# Patient Record
Sex: FEMALE | Race: AMERICAN INDIAN OR ALASKA NATIVE
[De-identification: names, ages, dates, MRNs, and addresses within clinical notes are randomized per-mention and may not be internally consistent; named-entity substitution may affect disease eponyms.]

---

## 2017-06-03 ENCOUNTER — HOSPITAL ENCOUNTER (EMERGENCY)
Dept: HOSPITAL 5 - ED | Age: 42
Discharge: HOME | End: 2017-06-03
Payer: COMMERCIAL

## 2017-06-03 VITALS — DIASTOLIC BLOOD PRESSURE: 71 MMHG | SYSTOLIC BLOOD PRESSURE: 166 MMHG

## 2017-06-03 DIAGNOSIS — E11.9: ICD-10-CM

## 2017-06-03 DIAGNOSIS — Z88.6: ICD-10-CM

## 2017-06-03 DIAGNOSIS — J45.21: Primary | ICD-10-CM

## 2017-06-03 DIAGNOSIS — I10: ICD-10-CM

## 2017-06-03 PROCEDURE — 99283 EMERGENCY DEPT VISIT LOW MDM: CPT

## 2017-06-03 PROCEDURE — 94640 AIRWAY INHALATION TREATMENT: CPT
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
## 2018-10-23 ENCOUNTER — HOSPITAL ENCOUNTER (EMERGENCY)
Dept: HOSPITAL 5 - ED | Age: 43
Discharge: HOME | End: 2018-10-23
Payer: COMMERCIAL

## 2018-10-23 VITALS — SYSTOLIC BLOOD PRESSURE: 134 MMHG | DIASTOLIC BLOOD PRESSURE: 80 MMHG
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
## 2018-10-23 DIAGNOSIS — Z88.7: ICD-10-CM

## 2018-10-23 DIAGNOSIS — S39.012A: Primary | ICD-10-CM
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
## 2018-10-23 DIAGNOSIS — Z88.6: ICD-10-CM
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
## 2018-10-23 LAB
BACTERIA #/AREA URNS HPF: (no result) /HPF
BILIRUB UR QL STRIP: (no result)
BLOOD UR QL VISUAL: (no result)
HYALINE CASTS #/AREA URNS LPF: 2 /LPF
MUCOUS THREADS #/AREA URNS HPF: (no result) /HPF
PH UR STRIP: 5 [PH] (ref 5–7)
RBC #/AREA URNS HPF: 2 /HPF (ref 0–6)
UROBILINOGEN UR-MCNC: 2 MG/DL (ref ?–2)
WBC #/AREA URNS HPF: 6 /HPF (ref 0–6)

## 2018-10-23 PROCEDURE — 81001 URINALYSIS AUTO W/SCOPE: CPT

## 2018-10-23 PROCEDURE — 99283 EMERGENCY DEPT VISIT LOW MDM: CPT

## 2018-10-23 NOTE — EMERGENCY DEPARTMENT REPORT
ED Back Pain/Injury HPI





- General


Chief Complaint: Back Pain/Injury


Stated Complaint: BACK PAIN


Time Seen by Provider: 10/23/18 13:52


Source: patient


Limitations: No Limitations





- History of Present Illness


Initial Comments: 





back pain for several days


no injury, pain primarily in R flank


denies abd pain, n/v, urinary complaints


no numbness, weakness, bowel/bladder dysfunction


pain worse with movement


MD Complaint: back pain


-: Gradual, days(s)


Place: home


Radiation: none


Severity: moderate


Severity scale (0 -10): 5


Quality: aching


Consistency: constant


Improves With: immobilization


Worsens With: movement


Associated Symptoms: denies other symptoms





- Related Data


 Previous Rx's











 Medication  Instructions  Recorded  Last Taken  Type


 


ALPRAZolam [Xanax TAB] 0.25 mg PO BID PRN #14 tab 07/11/15 Unknown Rx


 


Losartan/Hydrochlorothiazide 1 each PO QDAY #30 tablet 07/11/15 Unknown Rx





[Hyzaar 100-12.5 TAB]    


 


ALBUTEROL Inhaler (OR & NICU) 2 puff IH QID PRN #1 inhalation 11/21/16 Unknown 

Rx





[ProAir HFA Inhaler]    


 


ALBUTEROL NEB's [Proventil 0.083% 2.5 mg IH TID PRN #1 pump 06/03/17 Unknown Rx





NEBS]    


 


Acetamin/Codeine 120-12Mg/5 ml 5 ml PO TID PRN #50 ml 06/03/17 Unknown Rx





[Tylenol/Codeine]    


 


Albuterol Sulfate [Albuterol 0.63% 0.63 mg IH TID PRN #1 pack 06/03/17 Unknown 

Rx





NEBS]    


 


Benzonatate [Tessalon Perles] 100 mg PO Q8HR #21 capsule 06/03/17 Unknown Rx


 


predniSONE [Deltasone] 20 mg PO QDAY #5 tab 06/03/17 Unknown Rx


 


Lidocaine [Lidoderm] 1 each TP DAILY #10 adh..patch 10/23/18 Unknown Rx


 


tiZANidine [Zanaflex] 4 mg PO TID PRN #15 tablet 10/23/18 Unknown Rx











 Allergies











Allergy/AdvReac Type Severity Reaction Status Date / Time


 


aspirin AdvReac  Unknown Verified 10/03/16 18:26


 


NSAIDS (Non-Steroidal AdvReac  Unknown Verified 10/03/16 18:26





Anti-Inflamma     














ED Review of Systems


ROS: 


Stated complaint: BACK PAIN


Other details as noted in HPI





Comment: All other systems reviewed and negative


Genitourinary: denies: urgency, dysuria





ED Past Medical Hx


Family history: no significant family history





ED Back Pain Physical Exam





- Exam


General: 


Vital signs noted. No distress. Alert and acting appropriately.





Back/Abdomen: Yes Flank Tenderness (R, pain with movement), No Abdominal 

Tenderness, No Perithoracic Tenderness, No Perilumbar Tenderness, No Sacroiliac 

Tenderness, No Straight Leg Raise Pain


Neuro: Yes Normal Sensation, Yes Normal DTR's, Yes Normal Gait, No Motor 

Weakness





ED Medical Decision Making





- Medical Decision Making





flank pain


worse with movement


UA without blood or infection


likely muscular


will treat and refer for f/u





- Differential Diagnosis


strain/spasms, uti, stone 


Critical care attestation.: 


If time is entered above; I have spent that time in minutes in the direct care 

of this critically ill patient, excluding procedure time.








ED Disposition


Clinical Impression: 


Back strain


Qualifiers:


 Encounter type: initial encounter Qualified Code(s): S39.012A - Strain of 

muscle, fascia and tendon of lower back, initial encounter





Disposition: DC-01 TO HOME OR SELFCARE


Is pt being admited?: No


Condition: Good


Instructions:  Muscle Strain (ED)


Prescriptions: 


Lidocaine [Lidoderm] 1 each TP DAILY #10 adh..patch


tiZANidine [Zanaflex] 4 mg PO TID PRN #15 tablet


 PRN Reason: Spasms


Referrals: 


LORETO REAGAN MD [Staff Physician] - 3-5 Days


Time of Disposition: 14:19

## 2019-01-10 ENCOUNTER — HOSPITAL ENCOUNTER (OUTPATIENT)
Dept: HOSPITAL 5 - LAB | Age: 44
Discharge: HOME | End: 2019-01-10
Attending: NURSE PRACTITIONER
Payer: COMMERCIAL

## 2019-01-10 DIAGNOSIS — I10: Primary | ICD-10-CM

## 2019-01-10 DIAGNOSIS — E11.9: ICD-10-CM

## 2019-01-10 DIAGNOSIS — E55.9: ICD-10-CM

## 2019-01-10 DIAGNOSIS — Z90.710: ICD-10-CM

## 2019-01-10 DIAGNOSIS — Z88.6: ICD-10-CM

## 2019-01-10 LAB
ALBUMIN SERPL-MCNC: 4.2 G/DL (ref 3.9–5)
ALT SERPL-CCNC: 10 UNITS/L (ref 7–56)
BUN SERPL-MCNC: 13 MG/DL (ref 7–17)
BUN/CREAT SERPL: 19 %
CALCIUM SERPL-MCNC: 9.8 MG/DL (ref 8.4–10.2)
HEMOLYSIS INDEX: 14

## 2019-01-10 PROCEDURE — 83036 HEMOGLOBIN GLYCOSYLATED A1C: CPT

## 2019-01-10 PROCEDURE — 36415 COLL VENOUS BLD VENIPUNCTURE: CPT

## 2019-01-10 PROCEDURE — 82306 VITAMIN D 25 HYDROXY: CPT

## 2019-01-10 PROCEDURE — 80053 COMPREHEN METABOLIC PANEL: CPT

## 2019-01-17 LAB — VITAMIN D2 SERPL-MCNC: (no result) PG/ML

## 2019-06-07 ENCOUNTER — HOSPITAL ENCOUNTER (OUTPATIENT)
Dept: HOSPITAL 5 - MAMMO | Age: 44
Discharge: HOME | End: 2019-06-07
Attending: ADVANCED PRACTICE MIDWIFE
Payer: COMMERCIAL

## 2019-06-07 DIAGNOSIS — Z90.710: ICD-10-CM

## 2019-06-07 DIAGNOSIS — Z12.31: Primary | ICD-10-CM

## 2019-06-07 PROCEDURE — 77067 SCR MAMMO BI INCL CAD: CPT
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
## 2019-11-29 ENCOUNTER — HOSPITAL ENCOUNTER (EMERGENCY)
Dept: HOSPITAL 5 - ED | Age: 44
Discharge: HOME | End: 2019-11-29
Payer: SELF-PAY

## 2019-11-29 VITALS — DIASTOLIC BLOOD PRESSURE: 100 MMHG | SYSTOLIC BLOOD PRESSURE: 170 MMHG

## 2019-11-29 DIAGNOSIS — J45.909: ICD-10-CM

## 2019-11-29 DIAGNOSIS — J45.901: Primary | ICD-10-CM

## 2019-11-29 DIAGNOSIS — I10: ICD-10-CM

## 2019-11-29 DIAGNOSIS — E11.9: ICD-10-CM

## 2019-11-29 PROCEDURE — 99283 EMERGENCY DEPT VISIT LOW MDM: CPT

## 2019-11-29 PROCEDURE — 71046 X-RAY EXAM CHEST 2 VIEWS: CPT

## 2019-11-29 NOTE — XRAY REPORT
CHEST 2 VIEWS 



INDICATION / CLINICAL INFORMATION:

SOB.



COMPARISON: 

11/21/2016



FINDINGS:



SUPPORT DEVICES: None.



HEART / MEDIASTINUM: No significant abnormality. 



LUNGS / PLEURA: There is mild chronic appearing diffuse interstitial prominence without discrete cons
olidation or pleural effusion. No pneumothorax. 



ADDITIONAL FINDINGS: No significant additional findings.



IMPRESSION:

1. Chronic appearing diffuse reticular interstitial prominence most likely reflects chronic interstit
ial lung disease. This has worsened since 11/21/2016. There is no focal consolidation or pleural effu
tanner.



Signer Name: Johnny Gatica MD 

Signed: 11/29/2019 2:34 PM

 Workstation Name: [id number]
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
## 2020-02-20 ENCOUNTER — HOSPITAL ENCOUNTER (OUTPATIENT)
Dept: HOSPITAL 5 - LAB | Age: 45
Discharge: HOME | End: 2020-02-20
Attending: PHYSICIAN ASSISTANT
Payer: COMMERCIAL

## 2020-02-20 DIAGNOSIS — I10: ICD-10-CM

## 2020-02-20 DIAGNOSIS — E11.9: ICD-10-CM

## 2020-02-20 DIAGNOSIS — Z13.29: ICD-10-CM

## 2020-02-20 DIAGNOSIS — Z00.00: Primary | ICD-10-CM

## 2020-02-20 LAB
ALBUMIN SERPL-MCNC: 4.1 G/DL (ref 3.9–5)
ALT SERPL-CCNC: 10 UNITS/L (ref 7–56)
BUN SERPL-MCNC: 12 MG/DL (ref 7–17)
BUN/CREAT SERPL: 17 %
CALCIUM SERPL-MCNC: 9.4 MG/DL (ref 8.4–10.2)
HCT VFR BLD CALC: 36.6 % (ref 30.3–42.9)
HDLC SERPL-MCNC: 44 MG/DL (ref 40–59)
HEMOLYSIS INDEX: 3
HGB BLD-MCNC: 12.3 GM/DL (ref 10.1–14.3)
MCHC RBC AUTO-ENTMCNC: 34 % (ref 30–34)
MCV RBC AUTO: 85 FL (ref 79–97)
PLATELET # BLD: 337 K/MM3 (ref 140–440)
RBC # BLD AUTO: 4.28 M/MM3 (ref 3.65–5.03)

## 2020-02-20 PROCEDURE — 87591 N.GONORRHOEAE DNA AMP PROB: CPT

## 2020-02-20 PROCEDURE — 86592 SYPHILIS TEST NON-TREP QUAL: CPT

## 2020-02-20 PROCEDURE — 80061 LIPID PANEL: CPT

## 2020-02-20 PROCEDURE — 85027 COMPLETE CBC AUTOMATED: CPT

## 2020-02-20 PROCEDURE — 80053 COMPREHEN METABOLIC PANEL: CPT

## 2020-02-20 PROCEDURE — 84443 ASSAY THYROID STIM HORMONE: CPT

## 2020-02-20 PROCEDURE — 82306 VITAMIN D 25 HYDROXY: CPT

## 2020-02-20 PROCEDURE — 83036 HEMOGLOBIN GLYCOSYLATED A1C: CPT

## 2020-02-20 PROCEDURE — 86689 HTLV/HIV CONFIRMJ ANTIBODY: CPT

## 2020-02-20 PROCEDURE — 36415 COLL VENOUS BLD VENIPUNCTURE: CPT

## 2020-02-20 PROCEDURE — 84439 ASSAY OF FREE THYROXINE: CPT

## 2020-02-24 LAB — VITAMIN D2 SERPL-MCNC: <4 NG/ML

## 2020-03-23 ENCOUNTER — HOSPITAL ENCOUNTER (EMERGENCY)
Dept: HOSPITAL 5 - ED | Age: 45
LOS: 1 days | Discharge: LEFT BEFORE DISCHARGE | End: 2020-03-24
Payer: COMMERCIAL

## 2020-03-23 DIAGNOSIS — Z79.899: ICD-10-CM

## 2020-03-23 DIAGNOSIS — E11.9: ICD-10-CM

## 2020-03-23 DIAGNOSIS — Z90.710: ICD-10-CM

## 2020-03-23 DIAGNOSIS — I11.0: Primary | ICD-10-CM

## 2020-03-23 DIAGNOSIS — J45.909: ICD-10-CM

## 2020-03-23 DIAGNOSIS — I50.9: ICD-10-CM

## 2020-03-23 DIAGNOSIS — Z88.6: ICD-10-CM

## 2020-03-23 DIAGNOSIS — Z98.890: ICD-10-CM

## 2020-03-23 PROCEDURE — 83880 ASSAY OF NATRIURETIC PEPTIDE: CPT

## 2020-03-23 PROCEDURE — 99284 EMERGENCY DEPT VISIT MOD MDM: CPT

## 2020-03-23 PROCEDURE — 85025 COMPLETE CBC W/AUTO DIFF WBC: CPT

## 2020-03-23 PROCEDURE — 93010 ELECTROCARDIOGRAM REPORT: CPT

## 2020-03-23 PROCEDURE — 96374 THER/PROPH/DIAG INJ IV PUSH: CPT

## 2020-03-23 PROCEDURE — 80053 COMPREHEN METABOLIC PANEL: CPT

## 2020-03-23 PROCEDURE — 94640 AIRWAY INHALATION TREATMENT: CPT

## 2020-03-23 PROCEDURE — 96375 TX/PRO/DX INJ NEW DRUG ADDON: CPT

## 2020-03-23 PROCEDURE — 84484 ASSAY OF TROPONIN QUANT: CPT

## 2020-03-23 PROCEDURE — 93005 ELECTROCARDIOGRAM TRACING: CPT

## 2020-03-23 PROCEDURE — 71046 X-RAY EXAM CHEST 2 VIEWS: CPT

## 2020-03-23 PROCEDURE — 36415 COLL VENOUS BLD VENIPUNCTURE: CPT

## 2020-03-23 NOTE — EMERGENCY DEPARTMENT REPORT
Blank Doc





- Documentation


Documentation: 





44-year-old female that presents with cough and SOB.





This initial assessment/diagnostic orders/clinical plan/treatment(s) is/are 

subject to change based on patient's health status, clinical progression and re-

assessment by fellow clinical providers in the ED.  Further treatment and workup

at subsequent clinical providers discretion.  Patient/guardians urged not to 

elope from the ED as their condition may be serious if not clinically assessed 

and managed.  Initial orders include:


1- Patient sent to [facility] for further evaluation and treatment


2- CXR

## 2020-03-23 NOTE — XRAY REPORT
CHEST 2 VIEWS 



INDICATION / CLINICAL INFORMATION:

cough.



COMPARISON: 

Chest x-ray 11/29/2019



FINDINGS:



SUPPORT DEVICES: None.

HEART / MEDIASTINUM: Borderline enlarged with pulmonary venous hypertension 

LUNGS / PLEURA: Mild increased interstitial markings again noted. No pneumothorax. 



ADDITIONAL FINDINGS: No significant additional findings.



IMPRESSION:

1. Resolving interstitial disease



Signer Name: Karl Johnson MD 

Signed: 3/23/2020 10:16 PM

Workstation Name: [id number]

## 2020-03-24 VITALS — DIASTOLIC BLOOD PRESSURE: 99 MMHG | SYSTOLIC BLOOD PRESSURE: 142 MMHG

## 2020-03-24 LAB
ALBUMIN SERPL-MCNC: 4.1 G/DL (ref 3.9–5)
ALT SERPL-CCNC: 48 UNITS/L (ref 7–56)
BASOPHILS # (AUTO): 0 K/MM3 (ref 0–0.1)
BASOPHILS NFR BLD AUTO: 0.7 % (ref 0–1.8)
BUN SERPL-MCNC: 14 MG/DL (ref 7–17)
BUN/CREAT SERPL: 18 %
CALCIUM SERPL-MCNC: 9.4 MG/DL (ref 8.4–10.2)
EOSINOPHIL # BLD AUTO: 0.1 K/MM3 (ref 0–0.4)
EOSINOPHIL NFR BLD AUTO: 1.6 % (ref 0–4.3)
HCT VFR BLD CALC: 35 % (ref 30.3–42.9)
HEMOLYSIS INDEX: 7
HGB BLD-MCNC: 11.2 GM/DL (ref 10.1–14.3)
LYMPHOCYTES # BLD AUTO: 1.6 K/MM3 (ref 1.2–5.4)
LYMPHOCYTES NFR BLD AUTO: 26.1 % (ref 13.4–35)
MCHC RBC AUTO-ENTMCNC: 32 % (ref 30–34)
MCV RBC AUTO: 88 FL (ref 79–97)
MONOCYTES # (AUTO): 0.3 K/MM3 (ref 0–0.8)
MONOCYTES % (AUTO): 4.4 % (ref 0–7.3)
PLATELET # BLD: 278 K/MM3 (ref 140–440)
RBC # BLD AUTO: 3.97 M/MM3 (ref 3.65–5.03)

## 2020-03-24 NOTE — EMERGENCY DEPARTMENT REPORT
ED General Adult HPI





- General


Chief complaint: Dyspnea/Respdistress


Stated complaint: SOB/ASTHMA


Time Seen by Provider: 03/23/20 21:17


Source: patient


Mode of arrival: Ambulatory


Limitations: No Limitations





- History of Present Illness


Initial comments: 


Ms. Sandhu is a 43 y/o aaf with hx of Asthma, htn, CHF, and  dmii, pt on lasix for

edema control, no dx of chf to chart. Pt presents today for sob, dizziness, with

wheezing and cough, that is productive clear. There is no fever , no chills , no

n/v. pt rates symptoms as 4/10 , not improving with with albuterol inhaler. 

Symptoms are exacerbated by activity. 


Onset/Timing: 3


-: days(s)


Location: chest


Severity scale (0 -10): 5


Quality: aching


Consistency: constant


Improves with: none


Worsens with: other (environmental exposure )


Associated Symptoms: chest pain, cough, shortness of breath





- Related Data


                                  Previous Rx's











 Medication  Instructions  Recorded  Last Taken  Type


 


ALPRAZolam [Xanax TAB] 0.25 mg PO BID PRN #14 tab 07/11/15 Unknown Rx


 


Losartan/Hydrochlorothiazide 1 each PO QDAY #30 tablet 07/11/15 Unknown Rx





[Hyzaar 100-12.5 TAB]    


 


Albuterol INH(or & Nicu Only) 2 puff IH QID PRN #1 inhalation 11/21/16 Unknown 

Rx





[ProAir HFA Inhaler]    


 


ALBUTEROL NEB's [Proventil 0.083% 2.5 mg IH TID PRN #1 pump 06/03/17 Unknown Rx





NEBS]    


 


Acetamin/Codeine 120-12Mg/5 ml 5 ml PO TID PRN #50 ml 06/03/17 Unknown Rx





[Tylenol/Codeine]    


 


Albuterol Sulfate [Albuterol 0.63% 0.63 mg IH TID PRN #1 pack 06/03/17 Unknown 

Rx





NEBS]    


 


Benzonatate [Tessalon Perles] 100 mg PO Q8HR #21 capsule 06/03/17 Unknown Rx


 


predniSONE [Deltasone] 20 mg PO QDAY #5 tab 06/03/17 Unknown Rx


 


Lidocaine [Lidoderm] 1 each TP DAILY #10 adh..patch 10/23/18 Unknown Rx


 


tiZANidine [Zanaflex 4mg TAB] 4 mg PO TID PRN #15 tablet 10/23/18 Unknown Rx


 


Albuterol Sulfate [Proventil Hfa] 2 puff IH Q4HR PRN #1 hfa.aer.ad 11/29/19 

Unknown Rx


 


Benzonatate [Tessalon Perles] 100 mg PO Q8HR PRN #20 capsule 11/29/19 Unknown Rx


 


predniSONE [Deltasone] 50 mg PO QDAY #5 tab 11/29/19 Unknown Rx











                                    Allergies











Allergy/AdvReac Type Severity Reaction Status Date / Time


 


aspirin AdvReac  Unknown Verified 10/03/16 18:26


 


NSAIDS (Non-Steroidal AdvReac  Unknown Verified 10/03/16 18:26





Anti-Inflamma     














ED Review of Systems


ROS: 


Stated complaint: SOB/ASTHMA


Other details as noted in HPI





Constitutional: denies: chills, fever


Eyes: denies: eye pain, eye discharge, vision change


ENT: congestion


Respiratory: cough, shortness of breath, wheezing


Cardiovascular: chest pain


Endocrine: no symptoms reported


Gastrointestinal: denies: nausea, vomiting


Genitourinary: denies: urgency, dysuria, discharge


Musculoskeletal: denies: back pain, joint swelling, arthralgia


Skin: denies: rash, lesions


Neurological: denies: headache, weakness, paresthesias, vertigo


Psychiatric: denies: anxiety, depression


Hematological/Lymphatic: denies: easy bleeding, easy bruising





ED Past Medical Hx





- Past Medical History


Hx Hypertension: Yes


Hx Congestive Heart Failure: No


Hx Diabetes: Yes


Hx Asthma: Yes


Hx COPD: No





- Surgical History


Additional Surgical History: Multiple skin grafts, hysterectomy





- Social History


Smoking Status: Never Smoker


Substance Use Type: None





- Medications


Home Medications: 


                                Home Medications











 Medication  Instructions  Recorded  Confirmed  Last Taken  Type


 


ALPRAZolam [Xanax TAB] 0.25 mg PO BID PRN #14 tab 07/11/15 11/21/16 Unknown Rx


 


Losartan/Hydrochlorothiazide 1 each PO QDAY #30 tablet 07/11/15 11/21/16 Unknown

Rx





[Hyzaar 100-12.5 TAB]     


 


Albuterol INH(or & Nicu Only) 2 puff IH QID PRN #1 inhalation 11/21/16  Unknown 

Rx





[ProAir HFA Inhaler]     


 


ALBUTEROL NEB's [Proventil 0.083% 2.5 mg IH TID PRN #1 pump 06/03/17  Unknown Rx





NEBS]     


 


Acetamin/Codeine 120-12Mg/5 ml 5 ml PO TID PRN #50 ml 06/03/17  Unknown Rx





[Tylenol/Codeine]     


 


Albuterol Sulfate [Albuterol 0.63% 0.63 mg IH TID PRN #1 pack 06/03/17  Unknown 

Rx





NEBS]     


 


Benzonatate [Tessalon Perles] 100 mg PO Q8HR #21 capsule 06/03/17  Unknown Rx


 


predniSONE [Deltasone] 20 mg PO QDAY #5 tab 06/03/17  Unknown Rx


 


Lidocaine [Lidoderm] 1 each TP DAILY #10 adh..patch 10/23/18  Unknown Rx


 


tiZANidine [Zanaflex 4mg TAB] 4 mg PO TID PRN #15 tablet 10/23/18  Unknown Rx


 


Albuterol Sulfate [Proventil Hfa] 2 puff IH Q4HR PRN #1 hfa.aer.ad 11/29/19  

Unknown Rx


 


Benzonatate [Tessalon Perles] 100 mg PO Q8HR PRN #20 capsule 11/29/19  Unknown 

Rx


 


predniSONE [Deltasone] 50 mg PO QDAY #5 tab 11/29/19  Unknown Rx














ED Physical Exam





- General


Limitations: No Limitations


General appearance: alert, in no apparent distress





- Head


Head exam: Present: atraumatic, normocephalic





- Eye


Eye exam: Present: normal appearance, PERRL, EOMI


Pupils: Present: normal accommodation





- ENT


ENT exam: Present: normal orophraynx, mucous membranes moist.  Absent: TM's 

normal bilaterally, normal external ear exam





- Neck


Neck exam: Present: normal inspection





- Respiratory


Respiratory exam: Present: normal lung sounds bilaterally, wheezes, chest wall 

tenderness (anterior chest wall tenderness to palpation and cough ).  Absent: 

respiratory distress, rales, rhonchi, stridor, accessory muscle use, prolonged 

expiratory





- Cardiovascular


Cardiovascular Exam: Present: normal rhythm, normal heart sounds.  Absent: 

systolic murmur, diastolic murmur, rubs, gallop, clicks, JVD, S3, S4





- GI/Abdominal


GI/Abdominal exam: Present: soft, normal bowel sounds.  Absent: distended, t

enderness, bruit, hernia





- Rectal


Rectal exam: Present: deferred





- Extremities Exam


Extremities exam: Present: normal inspection, full ROM, normal capillary refill.

  Absent: tenderness, pedal edema, joint swelling, calf tenderness





- Back Exam


Back exam: Present: normal inspection, full ROM.  Absent: tenderness, CVA 

tenderness (R), CVA tenderness (L)





- Neurological Exam


Neurological exam: Present: alert, oriented X3, CN II-XII intact, normal gait, 

reflexes normal.  Absent: motor sensory deficit





- Psychiatric


Psychiatric exam: Present: normal affect, normal mood





- Skin


Skin exam: Present: warm, dry, intact, normal color.  Absent: rash





ED Course


                                   Vital Signs











  03/23/20





  21:17


 


Temperature 98.4 F


 


Pulse Rate 107 H


 


Respiratory 20





Rate 


 


Blood Pressure 159/106


 


O2 Sat by Pulse 97





Oximetry 














ED Medical Decision Making





- Lab Data


Result diagrams: 


                                 03/24/20 01:37





                                 03/24/20 01:37








Labs











  03/24/20 03/24/20





  01:37 01:37


 


WBC  6.0 


 


RBC  3.97 


 


Hgb  11.2 


 


Hct  35.0 


 


MCV  88 


 


MCH  28 


 


MCHC  32 


 


RDW  14.0 


 


Plt Count  278 


 


Lymph % (Auto)  26.1 


 


Mono % (Auto)  4.4 


 


Eos % (Auto)  1.6 


 


Baso % (Auto)  0.7 


 


Lymph #  1.6 


 


Mono #  0.3 


 


Eos #  0.1 


 


Baso #  0.0 


 


Seg Neutrophils %  67.2 


 


Seg Neutrophils #  4.0 


 


Sodium   141


 


Potassium   4.2


 


Chloride   103.8


 


Carbon Dioxide   24


 


Anion Gap   17


 


BUN   14


 


Creatinine   0.8


 


Estimated GFR   > 60


 


BUN/Creatinine Ratio   18


 


Glucose   209 H


 


Calcium   9.4


 


Total Bilirubin   0.50


 


AST   58 H


 


ALT   48


 


Alkaline Phosphatase   68


 


Troponin T   < 0.010


 


NT-Pro-B Natriuret Pep   3505 H


 


Total Protein   6.1 L


 


Albumin   4.1


 


Albumin/Globulin Ratio   2.1














- EKG Data


EKG shows normal: sinus rhythm, axis, intervals, QRS complexes, ST-T waves


Rate: tachycardia





- EKG Data


When compared to previous EKG there are: no significant change


Interpretation: normal EKG


NSTachycardia, hr 106, no ST Elevated MI, ekg interp by ed attending. 


03/24/20 01:45








- Radiology Data


Radiology results: report reviewed, image reviewed


Findings


Reporting MD: Karl Johnson Dictation Time: March 23, 2020 21:16 

: Not available Transcription Date:


 


CHEST 2 VIEWS  


 


INDICATION / CLINICAL INFORMATION:  cough.  


 


COMPARISON:  Chest x-ray 11/29/2019  


 


FINDINGS:  


 


SUPPORT DEVICES: None.  HEART / MEDIASTINUM: Borderline enlarged with pulmonary 

venous hypertension  LUNGS / PLEURA: Mild increased interstitial markings again 

noted. No pneumothorax.  


 


ADDITIONAL FINDINGS: No significant additional findings.  


 


IMPRESSION:  1. Resolving interstitial disease  


 


Signer Name: Karl Johnson MD  Signed: 3/23/2020 9:16 PM  Workstation Name: [email or web address]

[id number]








- Medical Decision Making


Chest x-ray no opacities no infiltrate, Resolving interstitial disease, normal 

sinus tach heart rate 107 no ST elevated MI EKG interpreted by ED attending, no 

change from baseline.  Patient denies chest pain or shortness of breath at this 

time after medications given in ED including nebulizer treatments and steroids. 

 I have discussed BNP of 3505,  with patient she declines to discuss admission 

or cardiology consult.  pt advises " I just missed by lasix, Plan:  40 Lasix IV,

  patient will follow-up with CHF clinic, via cardiology  as scheduled today.  

She will take Lasix as prescribed by cardiology ,she will return to ED should s

ymptoms worsen including chest pain , increased shortness of breath, activity 

intolerance,  or weight gain. pt currently appears well, with nad , lungs sounds

 clear bilat, no cp, pt is ambulatory with steady gait in ed with sob , or 

wheezing, there is no edeam, Heart score is 1 for comorbidies pt departs ed at 

this time in stable condition.    


Critical care attestation.: 


If time is entered above; I have spent that time in minutes in the direct care 

of this critically ill patient, excluding procedure time.








ED Disposition


Clinical Impression: 


CHF (congestive heart failure)


Qualifiers:


 Heart failure type: unspecified Heart failure chronicity: acute on chronic Qu

alified Code(s): I50.9 - Heart failure, unspecified





Disposition: DC-07 LEFT AGAINST MED ADVICE


Is pt being admited?: No


Does the pt Need Aspirin: No


Condition: Undetermined


Instructions:  Furosemide (By mouth), Heart Failure (ED)


Additional Instructions: 


take your lasix as prescribed, follow up with your cardiolgist today for chf 

clinic and medication opitimization, Return to ed if symptoms worsen . 


Referrals: 


HAWA RUSSELL MD [Staff Physician] - ASAP


Forms:  AMA Form


Time of Disposition: 02:55

## 2021-03-26 ENCOUNTER — HOSPITAL ENCOUNTER (OUTPATIENT)
Dept: HOSPITAL 5 - LAB | Age: 46
Discharge: HOME | End: 2021-03-26
Attending: NURSE PRACTITIONER
Payer: COMMERCIAL

## 2021-03-26 DIAGNOSIS — E55.9: ICD-10-CM

## 2021-03-26 DIAGNOSIS — E11.9: ICD-10-CM

## 2021-03-26 DIAGNOSIS — I10: ICD-10-CM

## 2021-03-26 DIAGNOSIS — Z00.00: Primary | ICD-10-CM

## 2021-03-26 LAB
ALBUMIN SERPL-MCNC: 3.9 G/DL (ref 3.9–5)
ALT SERPL-CCNC: 9 UNITS/L (ref 7–56)
BUN SERPL-MCNC: 13 MG/DL (ref 7–17)
BUN/CREAT SERPL: 19 %
CALCIUM SERPL-MCNC: 9.3 MG/DL (ref 8.4–10.2)
CREATININE,URINE: (no result) MG/DL (ref 0.1–20)
HCT VFR BLD CALC: 37.6 % (ref 30.3–42.9)
HDLC SERPL-MCNC: 45 MG/DL (ref 40–59)
HEMOLYSIS INDEX: 5
HGB BLD-MCNC: 12.7 GM/DL (ref 10.1–14.3)
MCHC RBC AUTO-ENTMCNC: 34 % (ref 30–34)
MCV RBC AUTO: 88 FL (ref 79–97)
PLATELET # BLD: 277 K/MM3 (ref 140–440)
RBC # BLD AUTO: 4.28 M/MM3 (ref 3.65–5.03)

## 2021-03-26 PROCEDURE — 82652 VIT D 1 25-DIHYDROXY: CPT

## 2021-03-26 PROCEDURE — 83036 HEMOGLOBIN GLYCOSYLATED A1C: CPT

## 2021-03-26 PROCEDURE — 85027 COMPLETE CBC AUTOMATED: CPT

## 2021-03-26 PROCEDURE — 80061 LIPID PANEL: CPT

## 2021-03-26 PROCEDURE — 82043 UR ALBUMIN QUANTITATIVE: CPT

## 2021-03-26 PROCEDURE — 36415 COLL VENOUS BLD VENIPUNCTURE: CPT

## 2021-03-26 PROCEDURE — 84443 ASSAY THYROID STIM HORMONE: CPT

## 2021-03-26 PROCEDURE — 80053 COMPREHEN METABOLIC PANEL: CPT
